# Patient Record
(demographics unavailable — no encounter records)

---

## 2025-03-25 NOTE — REVIEW OF SYSTEMS
[Fever] : no fever [Chills] : no chills [Feeling Poorly] : feeling poorly [Feeling Tired] : not feeling tired [Recent Weight Gain (___ Lbs)] : recent [unfilled] ~Ulb weight gain [Recent Weight Loss (___ Lbs)] : recent [unfilled] ~Ulb weight loss [Earache] : no earache [Loss Of Hearing] : hearing loss [Nosebleeds] : no nosebleeds [Nasal Discharge] : no nasal discharge [Sore Throat] : no sore throat [Hoarseness] : no hoarseness [Heart Rate Is Slow] : the heart rate was not slow [Heart Rate Is Fast] : the heart rate was not fast [Chest Pain] : no chest pain [Palpitations] : palpitations [Leg Claudication] : no intermittent leg claudication [Lower Ext Edema] : no lower extremity edema [Shortness Of Breath] : no shortness of breath [Wheezing] : no wheezing [Cough] : no cough [SOB on Exertion] : shortness of breath during exertion [Orthopnea] : no orthopnea [PND] : no PND [Abdominal Pain] : no abdominal pain [Vomiting] : no vomiting [Constipation] : no constipation [Diarrhea] : no diarrhea [Heartburn] : heartburn [Melena] : no melena [Dysuria] : no dysuria [Incontinence] : incontinence [Pelvic Pain] : no pelvic pain [Dysmenorrhea] : no dysmenorrhea [Vaginal Discharge] : no vaginal discharge [Abn Vaginal Bleeding] : no unexplained vaginal bleeding [Arthralgias] : no arthralgias [Joint Swelling] : no joint swelling [Joint Stiffness] : joint stiffness [Limb Pain] : no limb pain [Limb Swelling] : no limb swelling [Skin Lesions] : no skin lesions [Skin Wound] : no skin wound [Itching] : itching [Change In A Mole] : no change in a mole [Breast Pain] : no breast pain [Breast Lump] : breast lump [Suicidal] : not suicidal [Sleep Disturbances] : sleep disturbances [Anxiety] : anxiety [Depression] : depression [Change In Personality] : no personality change [Emotional Problems] : no emotional problems [Negative] : Heme/Lymph

## 2025-03-25 NOTE — HISTORY OF PRESENT ILLNESS
[FreeTextEntry1] : 53 yo female presenting today for left breast mass. She started Zepbound April 2024 and has lost 30 pounds. In July 2024 she self-palpated a breast mass.  She works as a massage therapist. She saw Dr. Chaidez and she also felt it. Denies any trauma, changes to herbs, diet or supplements.  She does SBE. She has not noticed a change in her breast or a breast lump on right. on left in the UIQ she feels a mass, nontender, no trauma She has not noticed a change in her nipple or nipple area. She has not noticed a change in the skin of the breast. She is not experiencing nipple discharge. She is not experiencing breast pain. She has not noticed a lump or lymph node under the armpit.  BREAST CANCER RISK FACTORS Menarche: 12 LMP: 9/14/2024 Menopause: pre  Grav: 0  Para: 0 Age at first live birth: n/a Nursed: n/a Hysterectomy: n/a Oophorectomy: n/a  OCP: yes  10 years  HRT: n/a  Last pap/pelvic exam: 08/2024  Related family history: breast ca mother @ 80, maternal grandmother stomach ca @ 80's Ashkenazi: no TCv8 risk assessment: 21.6% Genetic testing: no Bra size: 36B  Last mammogram: 08/16/2024    Location: [facility]  Report reviewed.     Images reviewed on PACS Results: BIRADS 2  no evidence of malignancy. Left 10:00 N 14cm area of clinical concern    Last ultrasound: 08/16/2024 left breast    Location: [facility] Report reviewed.     Images reviewed on PACS Results: BIRADS 2  left upper inner quadrant posterior one third depth, area of clinical concern, stable normal mostly fatty tissue, without abnormality. Scattered punctate round calcifications. Benign appearing axillary nodes.    Last MRI: 02/25/2025                      Location: [facility]  Report reviewed. Results: BIRADS 2 No MRI evidence of malignancy

## 2025-03-25 NOTE — PHYSICAL EXAM
[Normocephalic] : normocephalic [Atraumatic] : atraumatic [Supple] : supple [No Supraclavicular Adenopathy] : no supraclavicular adenopathy [Examined in the supine and seated position] : examined in the supine and seated position [Symmetrical] : symmetrical [No dominant masses] : no dominant masses in right breast  [No dominant masses] : no dominant masses left breast [No Nipple Retraction] : no left nipple retraction [No Nipple Discharge] : no left nipple discharge [No Axillary Lymphadenopathy] : no left axillary lymphadenopathy [No Edema] : no edema [No Rashes] : no rashes [No Ulceration] : no ulceration [de-identified] : UIQ there is nodularity overlying rib

## 2025-03-25 NOTE — ASSESSMENT
[FreeTextEntry1] : 52 yo female with left breast mass reviewed her risk status, she is high risk We reviewed risk reduction strategies including maintaining a BMI <25, limiting red meat intake and alcoholic beverages to 3 per week and exercise (150 min/ week low intensity or 75 min/week high intensity). And maintaining a normal vitamin D level  and stress management strategies. discussed MRI and how it works to evaluate dense breast plan MRI asap if negative plan CBE in 6 months to ensure stability plan mg/sono 8/2025 She knows to call or return sooner should any concerns or questions arise.

## 2025-03-25 NOTE — END OF VISIT
Addended by: Mic Beat on: 6/14/2021 06:32 PM     Modules accepted: Orders [Time Spent: ___ minutes] : I have spent [unfilled] minutes of time on the encounter which excludes teaching and separately reported services.